# Patient Record
Sex: MALE | Race: OTHER | NOT HISPANIC OR LATINO | Employment: FULL TIME | URBAN - METROPOLITAN AREA
[De-identification: names, ages, dates, MRNs, and addresses within clinical notes are randomized per-mention and may not be internally consistent; named-entity substitution may affect disease eponyms.]

---
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
## 2019-01-14 ENCOUNTER — OFFICE VISIT (OUTPATIENT)
Dept: FAMILY MEDICINE CLINIC | Facility: CLINIC | Age: 19
End: 2019-01-14

## 2019-01-14 VITALS
WEIGHT: 142 LBS | HEIGHT: 71 IN | DIASTOLIC BLOOD PRESSURE: 78 MMHG | BODY MASS INDEX: 19.88 KG/M2 | SYSTOLIC BLOOD PRESSURE: 120 MMHG | HEART RATE: 60 BPM

## 2019-01-14 DIAGNOSIS — Z02.89 ENCOUNTER FOR FEDERAL AVIATION ADMINISTRATION (FAA) EXAMINATION: Primary | ICD-10-CM

## 2019-01-14 PROCEDURE — 99499 UNLISTED E&M SERVICE: CPT | Performed by: FAMILY MEDICINE

## 2019-01-14 NOTE — PROGRESS NOTES
Chief Complaint   Patient presents with    Physical Exam     1st class conformation ( No EKG) #[id number] corrected vision  no meds

## 2019-02-13 ENCOUNTER — HOSPITAL ENCOUNTER (EMERGENCY)
Facility: HOSPITAL | Age: 19
Discharge: HOME | End: 2019-02-13
Attending: EMERGENCY MEDICINE | Admitting: EMERGENCY MEDICINE
Payer: OTHER MISCELLANEOUS

## 2019-02-13 ENCOUNTER — APPOINTMENT (EMERGENCY)
Dept: CT IMAGING | Facility: HOSPITAL | Age: 19
End: 2019-02-13
Payer: OTHER MISCELLANEOUS

## 2019-02-13 VITALS
BODY MASS INDEX: 19.59 KG/M2 | DIASTOLIC BLOOD PRESSURE: 59 MMHG | SYSTOLIC BLOOD PRESSURE: 123 MMHG | HEIGHT: 72 IN | WEIGHT: 144.62 LBS | HEART RATE: 55 BPM | OXYGEN SATURATION: 99 % | RESPIRATION RATE: 16 BRPM | TEMPERATURE: 98.2 F

## 2019-02-13 DIAGNOSIS — S09.90XA HEAD INJURY: Primary | ICD-10-CM

## 2019-02-13 PROCEDURE — 70450 CT HEAD/BRAIN W/O DYE: CPT

## 2019-02-13 PROCEDURE — 99284 EMERGENCY DEPT VISIT MOD MDM: CPT

## 2019-02-13 RX ORDER — BUTALBITAL, ACETAMINOPHEN AND CAFFEINE 50; 325; 40 MG/1; MG/1; MG/1
1 TABLET ORAL EVERY 4 HOURS PRN
Qty: 20 TABLET | Refills: 0 | Status: SHIPPED | OUTPATIENT
Start: 2019-02-13

## 2019-02-13 RX ORDER — BUTALBITAL, ACETAMINOPHEN AND CAFFEINE 50; 325; 40 MG/1; MG/1; MG/1
1 TABLET ORAL ONCE
Status: COMPLETED | OUTPATIENT
Start: 2019-02-13 | End: 2019-02-13

## 2019-02-13 RX ORDER — NAPROXEN 250 MG/1
500 TABLET ORAL ONCE
Status: COMPLETED | OUTPATIENT
Start: 2019-02-13 | End: 2019-02-13

## 2019-02-13 RX ORDER — NAPROXEN 500 MG/1
500 TABLET ORAL 2 TIMES DAILY WITH MEALS
Qty: 15 TABLET | Refills: 0 | Status: SHIPPED | OUTPATIENT
Start: 2019-02-13

## 2019-02-13 RX ADMIN — BUTALBITAL, ACETAMINOPHEN, AND CAFFEINE 1 TABLET: 50; 325; 40 TABLET ORAL at 17:19

## 2019-02-13 RX ADMIN — NAPROXEN 500 MG: 250 TABLET ORAL at 17:19

## 2019-02-13 NOTE — ED PROVIDER NOTES
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)  He denies loss of consciousness, denies confusion  He presents with a headache that started immediately after  He states that he felt kind of in a fog after hitting his head today  He is not on any blood thinners, states the had a prior head injury from playing soccer but could not give me specifics about it  Did not take pain control prior to arrival   He wanted to be evaluated to make sure no bleeding in the head  Patient presents with a GCS of 15, normal gait, normal strength in upper and lower extremities  No neuro deficits are noted  Prior to Admission Medications   Prescriptions Last Dose Informant Patient Reported? Taking?   naproxen (NAPROSYN) 500 mg tablet   No No   Sig: Take 1 tablet by mouth 2 (two) times a day with meals for 5 days      Facility-Administered Medications: None       History reviewed  No pertinent past medical history  History reviewed  No pertinent surgical history  History reviewed  No pertinent family history  I have reviewed and agree with the history as documented  Social History     Tobacco Use    Smoking status: Never Smoker    Smokeless tobacco: Never Used   Substance Use Topics    Alcohol use: No    Drug use: No        Review of Systems   Constitutional: Negative for chills and fever  HENT: Negative for congestion and rhinorrhea  Eyes: Positive for photophobia (Mild)  Negative for visual disturbance  Respiratory: Negative for chest tightness and shortness of breath  Cardiovascular: Negative for chest pain and leg swelling  Gastrointestinal: Negative for abdominal pain, nausea and vomiting     Musculoskeletal: Negative for back pain, neck pain and neck stiffness  Skin: Negative for rash and wound  Neurological: Positive for headaches  Negative for dizziness, facial asymmetry, speech difficulty, weakness, light-headedness and numbness  Hematological: Does not bruise/bleed easily  Psychiatric/Behavioral: Negative for confusion  Physical Exam  Physical Exam   Constitutional: He is oriented to person, place, and time  He appears well-developed and well-nourished  No distress  HENT:   Head: Normocephalic and atraumatic  Right Ear: External ear normal    Left Ear: External ear normal    Mouth/Throat: Oropharynx is clear and moist    No hemotympanum    Eyes: Pupils are equal, round, and reactive to light  Conjunctivae and EOM are normal  Right eye exhibits no discharge  Left eye exhibits no discharge  Neck: Neck supple  No tracheal deviation present  No midline tenderness, no step offs   Cardiovascular: Normal rate, regular rhythm, normal heart sounds and intact distal pulses  Pulmonary/Chest: Effort normal and breath sounds normal  No stridor  He has no wheezes  He exhibits no tenderness  CTA-BL   Abdominal: Soft  He exhibits no distension  There is no tenderness  There is no guarding  Musculoskeletal: Normal range of motion  He exhibits no tenderness or deformity  Back is non-tender, no step offs  Normal strength in upper and lower extremities  No loss of sensation  GCS is 15  Neurological: He is alert and oriented to person, place, and time  No cranial nerve deficit or sensory deficit  GCS = 15  Tenderness to palpation on the left parietal region  Skin: Skin is warm and dry  He is not diaphoretic  No abrasions, no lacerations, no contusions  Mild redness to the left parietal region where the patient hit  Psychiatric: He has a normal mood and affect   His behavior is normal        Vital Signs  ED Triage Vitals [02/13/19 1548]   Temperature Pulse Respirations Blood Pressure SpO2   98 2 °F (36 8 °C) 55 16 123/59 99 %      Temp Source Heart Rate Source Patient Position - Orthostatic VS BP Location FiO2 (%)   Oral Monitor Sitting Right arm --      Pain Score       --           Vitals:    02/13/19 1548   BP: 123/59   Pulse: 55   Patient Position - Orthostatic VS: Sitting       Visual Acuity      ED Medications  Medications - No data to display    Diagnostic Studies  Results Reviewed     None                 CT head without contrast   ED Interpretation by Alonzo Cruz DO (02/13 170)   No acute intracranial abnormality  Final Result by Yoel Zhu MD (02/13 1635)      No acute intracranial abnormality  Workstation performed: [id number]                    Procedures  Procedures       Phone Contacts  ED Phone Contact    ED Course                               MDM  Number of Diagnoses or Management Options  Head injury:   Diagnosis management comments: Head injury  CT scan is negative  Patient is advised follow-up with a primary care provider to rule out development of concussion  Patient given naproxen for headache control and given good return precautions in case of signs of neurovascular compromise  Amount and/or Complexity of Data Reviewed  Tests in the radiology section of CPT®: ordered and reviewed        Disposition  Final diagnoses:   Head injury     Time reflects when diagnosis was documented in both MDM as applicable and the Disposition within this note     Time User Action Codes Description Comment    2/13/2019  5:07 PM Izetta Curling Add [S09 [id number]] Head injury       ED Disposition     ED Disposition Condition Date/Time Comment    Discharge Stable Wed Feb 13, 2019  5:07 PM Chloe Nicely discharge to home/self care              Follow-up Information     Follow up With Specialties Details Why Contact Info Additional [address] [facility] Emergency Department Emergency Medicine  If symptoms worsen: worsening headache, nausea/vomiting, change in vision, etc [address], [place], [address] [facility]   call to find a PCP for a follow up if you do not have one  Must follow up to rule out concussion  [phone]             Patient's Medications   Discharge Prescriptions    NAPROXEN (NAPROSYN) 500 MG TABLET    Take 1 tablet (500 mg total) by mouth 2 (two) times a day with meals As needed for headache       Start Date: 2/13/2019 End Date: --       Order Dose: 500 mg       Quantity: 15 tablet    Refills: 0     No discharge procedures on file      ED Provider  Electronically Signed by           Aston Bower DO  02/13/19 1563

## 2024-03-28 ENCOUNTER — OFFICE VISIT (OUTPATIENT)
Dept: FAMILY MEDICINE CLINIC | Facility: CLINIC | Age: 24
End: 2024-03-28

## 2024-03-28 VITALS
DIASTOLIC BLOOD PRESSURE: 60 MMHG | HEART RATE: 60 BPM | WEIGHT: 145 LBS | BODY MASS INDEX: 19.64 KG/M2 | HEIGHT: 72 IN | SYSTOLIC BLOOD PRESSURE: 120 MMHG

## 2024-03-28 DIAGNOSIS — Z02.89 ENCOUNTER FOR FEDERAL AVIATION ADMINISTRATION (FAA) EXAMINATION: Primary | ICD-10-CM

## 2024-03-28 PROCEDURE — 99499 UNLISTED E&M SERVICE: CPT | Performed by: FAMILY MEDICINE

## 2024-03-28 NOTE — PROGRESS NOTES
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

## 2025-02-11 ENCOUNTER — OFFICE VISIT (OUTPATIENT)
Dept: URGENT CARE | Facility: CLINIC | Age: 25
End: 2025-02-11
Payer: COMMERCIAL

## 2025-02-11 VITALS
DIASTOLIC BLOOD PRESSURE: 86 MMHG | HEART RATE: 72 BPM | TEMPERATURE: 98.1 F | OXYGEN SATURATION: 97 % | BODY MASS INDEX: 19.07 KG/M2 | RESPIRATION RATE: 19 BRPM | WEIGHT: 140.6 LBS | SYSTOLIC BLOOD PRESSURE: 124 MMHG

## 2025-02-11 DIAGNOSIS — Z20.822 CONTACT WITH AND (SUSPECTED) EXPOSURE TO COVID-19: ICD-10-CM

## 2025-02-11 DIAGNOSIS — U07.1 COVID-19: Primary | ICD-10-CM

## 2025-02-11 LAB
SARS-COV-2 AG UPPER RESP QL IA: POSITIVE
VALID CONTROL: ABNORMAL

## 2025-02-11 PROCEDURE — 87811 SARS-COV-2 COVID19 W/OPTIC: CPT | Performed by: PHYSICIAN ASSISTANT

## 2025-02-11 PROCEDURE — 99203 OFFICE O/P NEW LOW 30 MIN: CPT | Performed by: PHYSICIAN ASSISTANT

## 2025-02-11 NOTE — LETTER
February 11, 2025     Patient: Everardo Maurer   YOB: 2000   Date of Visit: 2/11/2025       To Whom it May Concern:    Everardo Maurer was seen in my clinic on 2/11/2025. He may return to work on 2/16/2025 .    If you have any questions or concerns, please don't hesitate to call.         Sincerely,          Aurora Licona PA-C        CC: No Recipients

## 2025-02-11 NOTE — PATIENT INSTRUCTIONS
Upper Respiratory Infection     Over-the-counter medications to help with symptoms   Plain Robitussin or plain Mucinex as directed on the packaging for mucus relief  Sudafed (for those without history of high blood pressure) or Coricidin HBP (for those with history of high blood pressure) for nasal/sinus congestion  Ibuprofen or Acetaminophen for pain, fever, or sore throat   Afrin nasal spray (do not use more than 5 days!)   Saline nasal spray or Flonase nasal spray for nasal congestion  Vitamin C supplements may help shorten duration of colds  Other measures to help with illness   Get plenty of rest   Increase your fluid intake while you feel ill (especially drinks with electrolytes such as Gatorade or Pedialyte)  Follow up with your primary care provider if:  You develop a fever after being fever-free (>100 degrees F)  You have mild chest tightness or mild shortness of breath   Symptoms worsen after initially getting better   Symptoms persist more than 10 days   Go to the emergency room if:   You have moderate to severe chest tightness or shortness of breath   You have any other severe or concerning symptoms     Current CDC Guidelines for Covid, RSV, and Flu (Updated March 1, 2024):  While you are feeling ill and have a fever - stay home and away from others   Return to normal activities when you are fever free for 24+ hours without the need/use of fever-reducing medications   The next 5 days after returning to normal activities take additional measures to help reduce spreading disease  Taking more steps for  air (air purifiers, bringing more outside air indoors, etc.)  Practice good hygiene with frequent hand washing and coughing/sneezing in to your elbow   Wearing a well-fitting mask   Covid currently is less likely to cause severe illness due to widespread immunity and improved treatment options   Visit https://www.cdc.gov/media/releases/2024/p0301-respiratory-virus.html for the whole article

## 2025-02-11 NOTE — PROGRESS NOTES
[facility]        NAME: Everardo Maurer is a 24 y.o. male  : 2000    MRN: [id number]  DATE: 2025  TIME: 11:26 AM      Assessment and Plan     COVID-19 [U07.1]  1. COVID-19        2. Contact with and (suspected) exposure to covid-19  Poct Covid 19 Rapid Antigen Test          POC Testing Results    Rapid covid -- positive     Medical Decision Making Note:   Continue OTC medications as needed for symptoms     Patient Instructions     Patient Instructions   Upper Respiratory Infection     Over-the-counter medications to help with symptoms   Plain Robitussin or plain Mucinex as directed on the packaging for mucus relief  Sudafed (for those without history of high blood pressure) or Coricidin HBP (for those with history of high blood pressure) for nasal/sinus congestion  Ibuprofen or Acetaminophen for pain, fever, or sore throat   Afrin nasal spray (do not use more than 5 days!)   Saline nasal spray or Flonase nasal spray for nasal congestion  Vitamin C supplements may help shorten duration of colds  Other measures to help with illness   Get plenty of rest   Increase your fluid intake while you feel ill (especially drinks with electrolytes such as Gatorade or Pedialyte)  Follow up with your primary care provider if:  You develop a fever after being fever-free (>100 degrees F)  You have mild chest tightness or mild shortness of breath   Symptoms worsen after initially getting better   Symptoms persist more than 10 days   Go to the emergency room if:   You have moderate to severe chest tightness or shortness of breath   You have any other severe or concerning symptoms     Current CDC Guidelines for Covid, RSV, and Flu (Updated 2024):  While you are feeling ill and have a fever - stay home and away from others   Return to normal activities when you are fever free for 24+ hours without the need/use of fever-reducing medications   The next 5 days after returning to normal activities take  additional measures to help reduce spreading disease  Taking more steps for  air (air purifiers, bringing more outside air indoors, etc.)  Practice good hygiene with frequent hand washing and coughing/sneezing in to your elbow   Wearing a well-fitting mask   Covid currently is less likely to cause severe illness due to widespread immunity and improved treatment options   Visit https://www.cdc.gov/media/releases/2024/p0301-respiratory-virus.html for the whole article        Follow up with primary care provider.   Go to ER if symptoms worsen.    Chief Complaint     Chief Complaint   Patient presents with    Cold Like Symptoms     Pt c/o of coughing fits, fever, chest and nasal congestion, pain behind left eye, loss of taste and smell/ Pt states his 6 month old daughter is positive for covid. Took two 500mg tylenol- little help. Started last night.         History of Present Illness     Presents with cough, fever, chest congestion, nasal congestion, headache, loss of taste, loss of smell since yesterday.  He states his daughter was positive for COVID.  He has been taking over-the-counter Tylenol with mild relief.        Review of Systems     Review of Systems   Constitutional:  Positive for fatigue. Negative for chills and fever.   HENT:  Positive for congestion. Negative for ear pain, postnasal drip, rhinorrhea, sinus pressure, sinus pain, sneezing and sore throat.    Eyes:  Negative for pain and visual disturbance.   Respiratory:  Positive for cough. Negative for shortness of breath.    Cardiovascular:  Negative for chest pain and palpitations.   Gastrointestinal:  Negative for abdominal pain, diarrhea, nausea and vomiting.   Genitourinary:  Negative for dysuria and hematuria.   Musculoskeletal:  Negative for arthralgias, back pain and myalgias.   Skin:  Negative for rash.   Neurological:  Positive for headaches. Negative for dizziness, seizures, syncope and numbness.   All other systems reviewed and are  negative.        Current Medications       Current Outpatient Medications:     butalbital-acetaminophen-caffeine (FIORICET,ESGIC) -40 mg per tablet, Take 1 tablet by mouth every 4 (four) hours as needed for headaches (Patient not taking: Reported on 2/11/2025), Disp: 20 tablet, Rfl: 0    naproxen (NAPROSYN) 500 mg tablet, Take 1 tablet (500 mg total) by mouth 2 (two) times a day with meals As needed for headache (Patient not taking: Reported on 2/11/2025), Disp: 15 tablet, Rfl: 0    ranitidine (ZANTAC) 150 mg tablet, Take 1 tablet by mouth daily as needed (Patient not taking: Reported on 2/11/2025), Disp: , Rfl:     Current Allergies     Allergies as of 02/11/2025    (No Known Allergies)              The following portions of the patient's history were reviewed and updated as appropriate: allergies, current medications, past family history, past medical history, past social history, past surgical history, and problem list.     History reviewed. No pertinent past medical history.    History reviewed. No pertinent surgical history.    History reviewed. No pertinent family history.      Medications have been verified.        Objective     /86   Pulse 72   Temp 98.1 °F (36.7 °C)   Resp 19   Wt 63.8 kg (140 lb 9.6 oz)   SpO2 97%   BMI 19.07 kg/m²   No LMP for male patient.         Physical Exam     Physical Exam  Vitals and nursing note reviewed.   Constitutional:       Appearance: Normal appearance. He is normal weight.   HENT:      Head: Normocephalic and atraumatic.      Nose: Nose normal.      Mouth/Throat:      Mouth: Mucous membranes are moist.      Pharynx: Oropharynx is clear.   Cardiovascular:      Rate and Rhythm: Normal rate and regular rhythm.      Heart sounds: Normal heart sounds.   Pulmonary:      Effort: Pulmonary effort is normal.      Breath sounds: Normal breath sounds.   Skin:     General: Skin is warm and dry.   Neurological:      General: No focal deficit present.      Mental  Status: He is alert and oriented to person, place, and time.   Psychiatric:         Mood and Affect: Mood normal.         Behavior: Behavior normal.

## 2025-02-24 ENCOUNTER — NURSE TRIAGE (OUTPATIENT)
Age: 25
End: 2025-02-24

## 2025-02-24 NOTE — TELEPHONE ENCOUNTER
Patient called with chest pains lasting only a second over the last 5 days. Due to him not being a current patient, ED was recommended. Patient verbalized understanding but may monitor before going for evaluation.